# Patient Record
Sex: FEMALE | Race: WHITE | NOT HISPANIC OR LATINO | Employment: FULL TIME | URBAN - METROPOLITAN AREA
[De-identification: names, ages, dates, MRNs, and addresses within clinical notes are randomized per-mention and may not be internally consistent; named-entity substitution may affect disease eponyms.]

---

## 2024-11-04 ENCOUNTER — TELEPHONE (OUTPATIENT)
Dept: GASTROENTEROLOGY | Facility: CLINIC | Age: 64
End: 2024-11-04
Payer: COMMERCIAL

## 2024-11-04 NOTE — TELEPHONE ENCOUNTER
----- Message from Ana sent at 11/4/2024 10:26 AM CST -----  Regarding: Appointment  Contact: patient  Per phone call with patient, she stated that she would like to be schedule to see the physician regarding her stomach and she would like to be seen in the morning because she work the night shift.  The caller indicated that she has pain between the breast in the middle of the stomach.  Please return call at [phone] (home).    Thanks,  SJ

## 2024-11-04 NOTE — TELEPHONE ENCOUNTER
S/W pt regarding GI symptoms. Pt requested a appt with [facility] but is not a established pt. Staff informed pt that [facility] is not currently taking new pts with issues at this time. Staff Rec that the pt go to the nearest ER to be seen or have pts PCP send referral to another GI provider who can see the pt sooner.-MODESTOP